# Patient Record
(demographics unavailable — no encounter records)

---

## 2025-05-27 NOTE — ASSESSMENT
[Vaccines Reviewed] : Immunizations reviewed today. Please see immunization details in the vaccine log within the immunization flowsheet.  [FreeTextEntry1] : Specialists: -OBGYN: Dr. Giovanni Watson ([phone]) -Cardio: Dr. Shaver  -F/u labs drawn in office today -Further recs pending lab results -Continue routine f/u w/ Gyn (Pap) -RTO yearly for CPE or sooner if needed

## 2025-05-27 NOTE — HISTORY OF PRESENT ILLNESS
[FreeTextEntry1] : Annual well visit [de-identified] : -PMH: None known. Strong FH of early onset heart Dz & FH Bicuspid AV (Mother) Pt has had an ECHO which was normal  -SH: . 2 children. Works in . Former smoker (Quit 2012). Occasional EtOH use  MEENA is a 49 year F whom is here today for an annual well check  Specialists: -OBGYN: Dr. Giovanni Watson ([phone])  -Vaccines: All up to date -Mammogram: 1/2024 -Pap Smear: 7/2024 -Colon CA: Declines -FH of Colon, breast or ovarian CA: None known

## 2025-05-27 NOTE — HEALTH RISK ASSESSMENT
[Yes] : Yes [2 - 3 times a week (3 pts)] : 2 - 3  times a week (3 points) [1 or 2 (0 pts)] : 1 or 2 (0 points) [Never (0 pts)] : Never (0 points) [No] : In the past 12 months have you used drugs other than those required for medical reasons? No [0] : 2) Feeling down, depressed, or hopeless: Not at all (0) [PHQ-2 Negative - No further assessment needed] : PHQ-2 Negative - No further assessment needed [Former] : Former [15-19] : 15-19 [< 15 Years] : < 15 Years [Patient reported mammogram was normal] : Patient reported mammogram was normal [Patient reported PAP Smear was normal] : Patient reported PAP Smear was normal [Patient declined colonoscopy] : Patient declined colonoscopy [HIV test declined] : HIV test declined [Hepatitis C test declined] : Hepatitis C test declined [None] : None [With Family] : lives with family [Employed] : employed [] :  [# Of Children ___] : has [unfilled] children [Reviewed no changes] : Reviewed, no changes [Excellent] : ~his/her~ current health as excellent [Audit-CScore] : 3 [UKQ6Sktvd] : 0 [Change in mental status noted] : No change in mental status noted [Reports changes in hearing] : Reports no changes in hearing [Reports changes in vision] : Reports no changes in vision [MammogramDate] : 01/24 [MammogramComments] : per patient [PapSmearDate] : 07/23 [PapSmearComments] : per patient [ColonoscopyDate] : 05/25 [ColonoscopyComments] : cologuard given [AdvancecareDate] : 05/25